# Patient Record
Sex: FEMALE | Race: WHITE
[De-identification: names, ages, dates, MRNs, and addresses within clinical notes are randomized per-mention and may not be internally consistent; named-entity substitution may affect disease eponyms.]

---

## 2019-11-03 ENCOUNTER — HOSPITAL ENCOUNTER (EMERGENCY)
Dept: HOSPITAL 47 - EC | Age: 54
Discharge: HOME | End: 2019-11-03
Payer: COMMERCIAL

## 2019-11-03 VITALS
SYSTOLIC BLOOD PRESSURE: 149 MMHG | RESPIRATION RATE: 18 BRPM | TEMPERATURE: 97.4 F | DIASTOLIC BLOOD PRESSURE: 93 MMHG | HEART RATE: 84 BPM

## 2019-11-03 DIAGNOSIS — F17.200: ICD-10-CM

## 2019-11-03 DIAGNOSIS — Z53.20: ICD-10-CM

## 2019-11-03 DIAGNOSIS — R05: ICD-10-CM

## 2019-11-03 DIAGNOSIS — Z88.1: ICD-10-CM

## 2019-11-03 DIAGNOSIS — R09.81: ICD-10-CM

## 2019-11-03 DIAGNOSIS — H66.92: Primary | ICD-10-CM

## 2019-11-03 PROCEDURE — 99283 EMERGENCY DEPT VISIT LOW MDM: CPT

## 2019-11-03 NOTE — ED
General Adult HPI





- General


Chief complaint: ENT


Stated complaint: Ear ache


Time Seen by Provider: 11/03/19 01:30 EDT


Source: patient, RN notes reviewed, old records reviewed


Mode of arrival: ambulatory


Limitations: no limitations





- History of Present Illness


Initial comments: 


54-year-old female patient with no pertinent past medical history presents to [facility] chief complaint of 3 days of left ear pain.  Patient also reports a mild 

cough.  Reports she has felt warm at home, denies any known fevers.  Denies 

abdominal pain chest pain or shortness of breath.





Systemic: Pt denies fatigue, fever/chills, rash. Pt denies weakness, night 

sweats, weight loss. 


Neuro: Pt denies headache, visual disturbances, syncope or pre-syncope.


HEENT: Pt denies ocular discharge or irritation, rhinorrhea, pharyngitis or 

notable lymphadenopathy. 


Cardiopulmonary: Pt denies chest pain, SOB, heart palpitations, dyspnea on 

exertion.  


Abdominal/GI: Pt denies abdominal pain, n/v/d. 


: Pt denies dysuria, burning w/ urination, frequency/urgency. Denies new onset

urinary or bowel incontinence.  


MSK: Pt denies myalgia, loss of strength or function in extremities. 


Neuro: Pt denies new onset weakness, paresthesias. 








- Related Data


                                  Previous Rx's











 Medication  Instructions  Recorded


 


Amoxicillin/Potassium Clav 1 each PO Q12HR #20 tab 11/03/19





[Augmentin 875-125 Tablet]  











                                    Allergies











Allergy/AdvReac Type Severity Reaction Status Date / Time


 


levofloxacin [From Levaquin] Allergy  Rash/Hives Verified 11/03/19 01:21 EDT














Review of Systems


ROS Statement: 


Those systems with pertinent positive or pertinent negative responses have been 

documented in the HPI.





ROS Other: All systems not noted in ROS Statement are negative.





Past Medical History


Past Medical History: Thyroid Disorder


History of Any Multi-Drug Resistant Organisms: None Reported


Past Surgical History: Appendectomy, Back Surgery, Orthopedic Surgery


Additional Past Surgical History / Comment(s): bilateral knees thumb


Past Psychological History: Anxiety


Smoking Status: Current every day smoker


Past Alcohol Use History: None Reported


Past Drug Use History: None Reported





General Exam





- General Exam Comments


Initial Comments: 





Constitutional: NAD, AOX3, Pt has pleasant affect. 


HEENT: NC/AT, trachea midline, neck supple, no lymphadenopathy. Posterior pharyn

x non erythematous, without exudates. External ears appear normal, without 

discharge.  Right TM pale gray, no bulging erythema no perforation.  Left TM 

erythematous, bulging with effusion.  No perforation.  Mucous membranes moist. 

Eyes PERRLA, EOM intact. There is no scleral icterus. No pallor noted. 


Cardiopulmonary: RRR, no murmurs, rubs or gallops, no JVD noted. Lungs CTAB in 

anterior and posterior fields. No peripheral edema. 


Abdominal exam: Abdomen soft and non-distended. Abdomen non-tender to palpation 

in all 4 quadrants. Bowel sounds active in LLQ. No hepatosplenomegaly. No 

ecchymosis


Neuro: CN II-XII grossly intact. No nuchal rigidity. No raccon eyes, no london 

sign, no hemotympanum. No cervical spinal tenderness. 


MSK: No posterior calf tenderness bilaterally, homans sign negative bilaterally.

Posterior tibialis and radial pulse +2 bilaterally. Sensation intact in upper 

and lower extremities. Full active ROM in upper and lower extremities, 5/5 

stregnth. 











Limitations: no limitations





Course


                                   Vital Signs











  11/03/19





  01:18 EDT


 


Temperature 97.4 F L


 


Pulse Rate 84


 


Respiratory 18





Rate 


 


Blood Pressure 149/93


 


O2 Sat by Pulse 100





Oximetry 














Medical Decision Making





- Medical Decision Making





54-year-old female patient presents in ED with chief complaint of left otalgia. 

Sinus congestion, cough.  Patient vital signs are stable, afebrile.  Physical 

symptoms with left otitis media with effusion.  Patient declined chest x-ray.  

Patient was discharged with Flonase, Augmentin.  Will follow up with primary 

care" and will return to ER if condition worsens.  Case discussed with Dr. Paul. 














Disposition


Clinical Impression: 


 Otitis media





Disposition: HOME SELF-CARE


Condition: Stable


Instructions (If sedation given, give patient instructions):  Ear Infection (ED)


Additional Instructions: 


Patient to adhere to previously discussed treatment plan and will take 

medication(s) as directed. Patient to follow up with PCP in 1-2 days. Patient to

return to ED if symptoms do not improve. 





Take medication as directed.  Return to ER if condition worsens.


Prescriptions: 


Amoxicillin/Potassium Clav [Augmentin 875-125 Tablet] 1 each PO Q12HR #20 tab


Is patient prescribed a controlled substance at d/c from ED?: No


Referrals: 


Funmi Nathan DO [Primary Care Provider] - 1-2 days

## 2019-11-04 ENCOUNTER — HOSPITAL ENCOUNTER (EMERGENCY)
Dept: HOSPITAL 47 - EC | Age: 54
Discharge: HOME | End: 2019-11-04
Payer: COMMERCIAL

## 2019-11-04 VITALS
RESPIRATION RATE: 20 BRPM | TEMPERATURE: 97.8 F | DIASTOLIC BLOOD PRESSURE: 80 MMHG | SYSTOLIC BLOOD PRESSURE: 136 MMHG | HEART RATE: 82 BPM

## 2019-11-04 DIAGNOSIS — Z88.1: ICD-10-CM

## 2019-11-04 DIAGNOSIS — H66.92: Primary | ICD-10-CM

## 2019-11-04 DIAGNOSIS — F17.200: ICD-10-CM

## 2019-11-04 PROCEDURE — 96372 THER/PROPH/DIAG INJ SC/IM: CPT

## 2019-11-04 PROCEDURE — 99282 EMERGENCY DEPT VISIT SF MDM: CPT

## 2019-11-04 NOTE — ED
General Adult HPI





- General


Chief complaint: ENT


Stated complaint: Blood coming out of ear


Time Seen by Provider: 11/04/19 21:23


Source: patient


Mode of arrival: ambulatory


Limitations: no limitations





- History of Present Illness


Initial comments: 


54-year-old female patient presents to the emergency department today for 

evaluation of left ear pain.  Patient was seen and evaluated here 2 days ago and

diagnosed with urinary infection.  Patient was unable to obtain her antibiotics 

due to transportation issues.  Today she is reporting increased ear pain and 

bloody purulent drainage.  She denies any fever or chills.  Denies any 

difficulty swallowing.  Patient denies history of ear infections as an adult.  

States she has been taking Tylenol codeine for pain and hasn't been helping 

much. Patient denies any recent rash, shortness breath, chest pain, abdominal 

pain, nausea, vomiting, diarrhea, constipation, back pain, numbness, tingling, 

dizziness, weakness, hematuria, dysuria, urinary urgency, urinary frequency, 

headache, visual changes, or any other complaints.








- Related Data


                                  Previous Rx's











 Medication  Instructions  Recorded


 


Amoxicillin/Potassium Clav 1 each PO Q12HR #20 tab 11/03/19





[Augmentin 875-125 Tablet]  


 


Ibuprofen [Motrin] 600 mg PO Q8HR PRN #30 tab 11/04/19


 


Meclizine HCl 25 mg PO BID #14 tablet 11/04/19











                                    Allergies











Allergy/AdvReac Type Severity Reaction Status Date / Time


 


levofloxacin [From Levaquin] Allergy  Rash/Hives Verified 11/04/19 21:18














Review of Systems


ROS Statement: 


Those systems with pertinent positive or pertinent negative responses have been 

documented in the HPI.





ROS Other: All systems not noted in ROS Statement are negative.





Past Medical History


Past Medical History: Thyroid Disorder


History of Any Multi-Drug Resistant Organisms: None Reported


Past Surgical History: Appendectomy, Back Surgery, Orthopedic Surgery


Additional Past Surgical History / Comment(s): bilateral knees thumb


Past Psychological History: Anxiety


Smoking Status: Current every day smoker


Past Alcohol Use History: None Reported


Past Drug Use History: None Reported





General Exam


Limitations: no limitations


General appearance: alert, in no apparent distress, other (This is a well-

developed, well-nourished adult female patient in no acute distress.  Vital 

signs upon presentation are temperature 97.8F, pulse 82, respirations 20, blood

pressure 136/80, pulse ox 99% on room air.)


Eye exam: Present: normal appearance, PERRL, EOMI.  Absent: scleral icterus, 

conjunctival injection, periorbital swelling


ENT exam: Present: normal oropharynx, mucous membranes moist.  Absent: TM's 

normal bilaterally (Left tympanic membrane is bulging, erythematous, presence of

effusion.  There is no evidence for tympanic membrane rupture, current bleeding,

or current drainage.)


Neck exam: Present: normal inspection.  Absent: tenderness, meningismus, 

lymphadenopathy


Respiratory exam: Present: normal lung sounds bilaterally.  Absent: respiratory 

distress, wheezes, rales, rhonchi, stridor


Cardiovascular Exam: Present: regular rate, normal rhythm, normal heart sounds. 

Absent: systolic murmur, diastolic murmur, rubs, gallop, clicks


Neurological exam: Present: alert, oriented X3, CN II-XII intact


Psychiatric exam: Present: normal affect, normal mood


Skin exam: Present: warm, dry, intact, normal color.  Absent: rash





Course


                                   Vital Signs











  11/04/19





  21:14


 


Temperature 97.8 F


 


Pulse Rate 82


 


Respiratory 20





Rate 


 


Blood Pressure 136/80


 


O2 Sat by Pulse 99





Oximetry 














Medical Decision Making





- Medical Decision Making


54-year-old female patient presented to the emergency department today for 

evaluation of left ear pain.  Physical examination did reveal a bulging, 

erythematous left tympanic membrane with no current drainage or evidence for 

tympanic membrane rupture.  Patient will be given a starter pack for Augmentin 

until she can fill her prescription.  She is also given meclizine for 

complaining of dizziness.  She is instructed to follow-up with the ENT 

specialist for further evaluation as soon as possible.  Return parameters were 

discussed in detail.  They verbalize understanding and agree with this plan.








Disposition


Clinical Impression: 


 Left otitis media





Disposition: HOME SELF-CARE


Condition: Good


Instructions (If sedation given, give patient instructions):  Ear Infection (ED)


Additional Instructions: 


Take medication as directed.  Complete antibiotic prescription in full.  Follow-

up with the ears, nose, throat specialist if symptoms are not improved.  Return 

to the emergency department immediately for any new, worsening, or concerning 

symptoms.


Prescriptions: 


Meclizine HCl 25 mg PO BID #14 tablet


Ibuprofen [Motrin] 600 mg PO Q8HR PRN #30 tab


 PRN Reason: Pain


Is patient prescribed a controlled substance at d/c from ED?: No


Referrals: 


Funmi Nathan DO [Primary Care Provider] - 1-2 days


Kenji Alaniz MD [STAFF PHYSICIAN] - 1-2 days


Time of Disposition: 21:57

## 2020-04-14 ENCOUNTER — HOSPITAL ENCOUNTER (EMERGENCY)
Dept: HOSPITAL 47 - EC | Age: 55
Discharge: HOME | End: 2020-04-14
Payer: COMMERCIAL

## 2020-04-14 VITALS — DIASTOLIC BLOOD PRESSURE: 85 MMHG | HEART RATE: 81 BPM | TEMPERATURE: 97 F | SYSTOLIC BLOOD PRESSURE: 145 MMHG

## 2020-04-14 VITALS — RESPIRATION RATE: 18 BRPM

## 2020-04-14 DIAGNOSIS — S61.212A: Primary | ICD-10-CM

## 2020-04-14 DIAGNOSIS — Z88.1: ICD-10-CM
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
## 2020-04-14 DIAGNOSIS — S61.214A: ICD-10-CM
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
## 2020-04-14 DIAGNOSIS — F17.200: ICD-10-CM
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
## 2020-04-14 PROCEDURE — 99283 EMERGENCY DEPT VISIT LOW MDM: CPT

## 2020-04-14 PROCEDURE — 12002 RPR S/N/AX/GEN/TRNK2.6-7.5CM: CPT

## 2020-04-14 NOTE — ED
General Adult HPI





- General


Chief complaint: Wound/Laceration


Stated complaint: R Finger Lac


Time Seen by Provider: 04/14/20 20:44


Source: patient, RN notes reviewed, old records reviewed


Mode of arrival: ambulatory


Limitations: no limitations





- History of Present Illness


Initial comments: 


54-year-old female patient with no pertinent past history presents to ED for 

evaluation of right hand laceration.  Patient reports that she attempted to grab

a kitchen utensil however instead she grab an exposed knife.  She states that 

her tetanus is up-to-date.  She reports that the knife was clean.  She denies 

any other injury.  She denies any other complaints.





Systemic: Pt denies fatigue, fever/chills, rash. Pt denies weakness, night 

sweats, weight loss. 


Neuro: Pt denies headache, visual disturbances, syncope or pre-syncope.


HEENT: Pt denies ocular discharge or irritation, otalgia, rhinorrhea, 

pharyngitis or notable lymphadenopathy. 


Cardiopulmonary: Pt denies chest pain, SOB, heart palpitations, dyspnea on 

exertion.  


Abdominal/GI: Pt denies abdominal pain, n/v/d. 


: Pt denies dysuria, burning w/ urination, frequency/urgency. Denies new onset

urinary or bowel incontinence.  


MSK: Pt denies myalgia, loss of strength or function in extremities. 


Neuro: Pt denies new onset weakness, paresthesias. 

















- Related Data


                                  Previous Rx's











 Medication  Instructions  Recorded


 


Amoxicillin/Potassium Clav 1 each PO Q12HR #20 tab 11/03/19





[Augmentin 875-125 Tablet]  


 


Ibuprofen [Motrin] 600 mg PO Q8HR PRN #30 tab 11/04/19


 


Meclizine HCl 25 mg PO BID #14 tablet 11/04/19











                                    Allergies











Allergy/AdvReac Type Severity Reaction Status Date / Time


 


levofloxacin [From Levaquin] Allergy  Rash/Hives Verified 04/14/20 20:44














Review of Systems


ROS Statement: 


Those systems with pertinent positive or pertinent negative responses have been 

documented in the HPI.





ROS Other: All systems not noted in ROS Statement are negative.





Past Medical History


Past Medical History: Thyroid Disorder


History of Any Multi-Drug Resistant Organisms: None Reported


Past Surgical History: Appendectomy, Back Surgery, Orthopedic Surgery


Additional Past Surgical History / Comment(s): bilateral knees thumb


Past Psychological History: Anxiety


Smoking Status: Current every day smoker


Past Alcohol Use History: None Reported


Past Drug Use History: None Reported





General Exam





- General Exam Comments


Initial Comments: 





Constitutional: NAD, AOX3, Pt has pleasant affect. 


HEENT: NC/AT, trachea midline, neck supple, no lymphadenopathy. Posterior 

pharynx non erythematous, without exudates. External ears appear normal, without

discharge. Mucous membranes moist. Eyes PERRLA, EOM intact. There is no scleral 

icterus. No pallor noted. 


Cardiopulmonary: RRR, no murmurs, rubs or gallops, no JVD noted. Lungs CTAB in 

anterior and posterior fields. No peripheral edema. 


Abdominal exam: Abdomen soft and non-distended. Abdomen non-tender to palpation 

in all 4 quadrants. Bowel sounds active in LLQ. No hepatosplenomegaly. No 

ecchymosis


Neuro: CN II-XII grossly intact. No nuchal rigidity. No raccon eyes, no london 

sign, no hemotympanum. No cervical spinal tenderness. 


MSK: 2 separate lacerations identified.  First laceration on third digit 

proximal to the PIP joint.  2 cm.  Vigorously irrigated approximated with 2 

simple interrupted sutures.  No ligamentous or bony involvement.  Full active 

range of motion of digit.  Neurovascularly intact.  Approximately 2 simple 

interrupted sutures.  Second laceration distal aspect of fourth digit distal to 

DIP joint.  2.5 cm laceration.  Vigorously irrigated.  Full active range of 

motion of digit.  Neurovascularly intact.  Approximated with 5 simple 

interrupted sutures.  No ligamentous or bony involvement.  Full active ROM in 

upper and lower extremities, 5/5 stregnth. 











Limitations: no limitations





Course





                                   Vital Signs











  04/14/20





  20:40


 


Temperature 98.2 F


 


Pulse Rate 83


 


Respiratory 18





Rate 


 


Blood Pressure 154/88


 


O2 Sat by Pulse 98





Oximetry 














Procedures





- Laceration


  ** Laceration #1


Consent Obtained: verbal consent


Indication: laceration


Site: hand (third digit )


Size (cm): 2


Description: linear


Depth: simple, single layer


Anesthetic Used: lidocaine 1%


Anesthesia Technique: local infiltration


Amount (mls): 2


Pre-repair: wound explored, irrigated extensively, deep structures intact


Type of Sutures: nylon


Size of Sutures: 5-0


Number of Sutures: 2


Technique: simple, interrupted


Patient Tolerated Procedure: well, no complications





  ** Laceration #2


Consent Obtained: verbal consent


Indication: laceration


Site: hand (4th digit )


Size (cm): 2 (2.5)


Description: linear


Depth: simple, single layer


Anesthetic Used: lidocaine 1%


Anesthesia Technique: local infiltration


Amount (mls): 3


Pre-repair: wound explored, irrigated extensively, deep structures intact


Type of Sutures: nylon


Size of Sutures: 5-0


Number of Sutures: 5


Technique: simple, interrupted


Patient Tolerated Procedure: well, no complications





Medical Decision Making





- Medical Decision Making





54-year-old female patient presents to ED for evaluation of laceration.  Patient

grabbed an exposed knife which was clean.  Tetanus is up-to-date.  Patient opens

her stable, afebrile.  Physical exam 22 lacerations which were irrigated and 

repaired.  Full active range of motion of ligaments is going to.  Patient will 

be discharged will follow up with primary care grandma return to ER if condition

worsens.  Case discussed with Dr. Tapia. 

















Disposition


Clinical Impression: 


 Laceration





Disposition: HOME SELF-CARE


Condition: Stable


Instructions (If sedation given, give patient instructions):  Laceration (ED), 

Care For Your Stitches (ED)


Additional Instructions: 


Patient to adhere to previously discussed treatment plan. Patient to follow up 

with PCP in 1-2 days. Patient to return to ED if symptoms do not improve. 











Please return for suture removal: 





Hand: 7-10 days


Face: 5 days


Chest/abdomen: 12-14 days


Extremities: 7-10 days


Scalp: 7 days 


Eyebrow: 5-7 days


Foot/sole: 12-14 days 





Please monitor for signs and symptoms of infection including: redness, warmth, 

drainage, discharge. 





Please return to ED if these signs or symptoms occur, new signs or symptoms 

develop or if condition worsens in anyway. 


Is patient prescribed a controlled substance at d/c from ED?: No


Referrals: 


Funmi Nathan DO [Primary Care Provider] - 1-2 days